# Patient Record
Sex: FEMALE | Race: WHITE | NOT HISPANIC OR LATINO | URBAN - METROPOLITAN AREA
[De-identification: names, ages, dates, MRNs, and addresses within clinical notes are randomized per-mention and may not be internally consistent; named-entity substitution may affect disease eponyms.]

---

## 2021-04-25 ENCOUNTER — EMERGENCY (EMERGENCY)
Facility: HOSPITAL | Age: 71
LOS: 1 days | Discharge: HOME | End: 2021-04-25
Attending: EMERGENCY MEDICINE
Payer: MEDICARE

## 2021-04-25 VITALS
OXYGEN SATURATION: 97 % | TEMPERATURE: 98 F | HEIGHT: 61 IN | RESPIRATION RATE: 18 BRPM | WEIGHT: 167.99 LBS | DIASTOLIC BLOOD PRESSURE: 84 MMHG | HEART RATE: 79 BPM | SYSTOLIC BLOOD PRESSURE: 166 MMHG

## 2021-04-25 PROCEDURE — 99282 EMERGENCY DEPT VISIT SF MDM: CPT

## 2021-04-25 PROCEDURE — 99283 EMERGENCY DEPT VISIT LOW MDM: CPT | Mod: 25

## 2021-04-25 PROCEDURE — 69200 CLEAR OUTER EAR CANAL: CPT | Mod: LT

## 2021-04-25 NOTE — ED ADULT NURSE NOTE - FINAL NURSING ELECTRONIC SIGNATURE
pt c/o vomiting starting now. denies any other symptoms.  just discharged from ED. 25-Apr-2021 15:53

## 2021-04-25 NOTE — ED ADULT NURSE NOTE - CAS ELECT INFOMATION PROVIDED
seen, treated, and released by NP Juanloch in intake. No nursing intervention required./DC instructions

## 2021-04-25 NOTE — ED PROVIDER NOTE - LEFT EAR
TRANSPARENT PIECE OF RUBBER STUCK IN LEFT EAR CANAL, no swelling, no erythema of ear canal./TM clear

## 2021-04-25 NOTE — ED PROVIDER NOTE - NSFOLLOWUPINSTRUCTIONS_ED_ALL_ED_FT
Follow up with the primary care doctor as needed.  If you experience any new or worsening symptoms or if you are concerned you can always come back to the emergency for a re-evaluation.

## 2021-04-25 NOTE — ED PROVIDER NOTE - CLINICAL SUMMARY MEDICAL DECISION MAKING FREE TEXT BOX
Foreign body extracted with alligator forceps. No signs of infection. Will dc and ask patient to f/u with PMD as needed.

## 2021-04-25 NOTE — ED PROVIDER NOTE - OBJECTIVE STATEMENT
69 y/o F patient presents to the ED with foreign body in left ear canal. Patient states it was her first time using a new hearing aide, and piece of rubber got stuck. Patient denies any pain, discharge or any other complaints.

## 2021-04-25 NOTE — ED PROVIDER NOTE - PROGRESS NOTE DETAILS
FB removed. Follow up with the PMD as needed. Pt is well appearing walking with steady gait, stable for discharge and follow up without fail with medical doctor. I had a detailed discussion with the patient and/or guardian regarding the historical points, exam findings, and any diagnostic results supporting the discharge diagnosis. Pt educated on care and need for follow up. Strict return instructions and red flag signs and symptoms discussed with patient. Questions answered. Pt shows understanding of discharge information and agrees to follow.

## 2021-04-25 NOTE — ED PROVIDER NOTE - PATIENT PORTAL LINK FT
You can access the FollowMyHealth Patient Portal offered by [facility] by registering at the following website: [email or web address]. By joining [email or web address]’s FollowMyHealth portal, you will also be able to view your health information using other applications (apps) compatible with our system.
